# Patient Record
Sex: FEMALE | Race: WHITE | NOT HISPANIC OR LATINO | Employment: UNEMPLOYED | URBAN - NONMETROPOLITAN AREA
[De-identification: names, ages, dates, MRNs, and addresses within clinical notes are randomized per-mention and may not be internally consistent; named-entity substitution may affect disease eponyms.]

---

## 2018-05-31 ENCOUNTER — OFFICE VISIT (OUTPATIENT)
Dept: ORTHOPEDIC SURGERY | Facility: CLINIC | Age: 43
End: 2018-05-31

## 2018-05-31 VITALS — WEIGHT: 185 LBS | HEIGHT: 62 IN | BODY MASS INDEX: 34.04 KG/M2

## 2018-05-31 DIAGNOSIS — M79.641 RIGHT HAND PAIN: Primary | ICD-10-CM

## 2018-05-31 DIAGNOSIS — S62.362A CLOSED NONDISPLACED FRACTURE OF NECK OF THIRD METACARPAL BONE OF RIGHT HAND, INITIAL ENCOUNTER: ICD-10-CM

## 2018-05-31 PROCEDURE — 26600 TREAT METACARPAL FRACTURE: CPT | Performed by: NURSE PRACTITIONER

## 2018-05-31 PROCEDURE — 99214 OFFICE O/P EST MOD 30 MIN: CPT | Performed by: NURSE PRACTITIONER

## 2018-05-31 RX ORDER — METHOCARBAMOL 500 MG/1
500 TABLET, FILM COATED ORAL
COMMUNITY

## 2018-05-31 RX ORDER — HYDROCODONE BITARTRATE AND ACETAMINOPHEN 5; 325 MG/1; MG/1
1 TABLET ORAL EVERY 6 HOURS PRN
Qty: 40 TABLET | Refills: 0 | Status: SHIPPED | OUTPATIENT
Start: 2018-05-31 | End: 2018-06-08

## 2018-05-31 RX ORDER — TRAZODONE HYDROCHLORIDE 50 MG/1
TABLET ORAL
COMMUNITY
Start: 2018-04-17

## 2018-05-31 RX ORDER — GABAPENTIN 300 MG/1
CAPSULE ORAL
COMMUNITY
Start: 2018-04-17

## 2018-05-31 RX ORDER — NAPROXEN 500 MG/1
500 TABLET ORAL 2 TIMES DAILY WITH MEALS
COMMUNITY

## 2018-06-06 DIAGNOSIS — S62.362A CLOSED NONDISPLACED FRACTURE OF NECK OF THIRD METACARPAL BONE OF RIGHT HAND, INITIAL ENCOUNTER: Primary | ICD-10-CM

## 2018-06-08 ENCOUNTER — OFFICE VISIT (OUTPATIENT)
Dept: ORTHOPEDIC SURGERY | Facility: CLINIC | Age: 43
End: 2018-06-08

## 2018-06-08 VITALS — WEIGHT: 191.6 LBS | HEIGHT: 62 IN | BODY MASS INDEX: 35.26 KG/M2

## 2018-06-08 DIAGNOSIS — S62.362D CLOSED NONDISPLACED FRACTURE OF NECK OF THIRD METACARPAL BONE OF RIGHT HAND WITH ROUTINE HEALING, SUBSEQUENT ENCOUNTER: Primary | ICD-10-CM

## 2018-06-08 DIAGNOSIS — M79.641 RIGHT HAND PAIN: ICD-10-CM

## 2018-06-08 PROBLEM — S62.362A CLOSED NONDISPLACED FRACTURE OF NECK OF THIRD METACARPAL BONE OF RIGHT HAND: Status: ACTIVE | Noted: 2018-06-08

## 2018-06-08 PROCEDURE — 99024 POSTOP FOLLOW-UP VISIT: CPT | Performed by: NURSE PRACTITIONER

## 2018-06-08 RX ORDER — HYDROCODONE BITARTRATE AND ACETAMINOPHEN 7.5; 325 MG/1; MG/1
1 TABLET ORAL EVERY 6 HOURS PRN
Qty: 40 TABLET | Refills: 0 | Status: SHIPPED | OUTPATIENT
Start: 2018-06-08 | End: 2018-06-18
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
## 2018-06-21 RX ORDER — HYDROCODONE BITARTRATE AND ACETAMINOPHEN 7.5; 325 MG/1; MG/1
1 TABLET ORAL EVERY 8 HOURS PRN
Qty: 40 TABLET | Refills: 0 | Status: SHIPPED | OUTPATIENT
Start: 2018-06-21

## 2018-06-21 NOTE — TELEPHONE ENCOUNTER
PATIENT IS REQUESTING A NEW PRESCRIPTION FOR NORCO 7.5MG.  SON, CHANDRA BROWN WILL ,    [phone]

## 2018-07-05 DIAGNOSIS — S62.362D CLOSED NONDISPLACED FRACTURE OF NECK OF THIRD METACARPAL BONE OF RIGHT HAND WITH ROUTINE HEALING, SUBSEQUENT ENCOUNTER: Primary | ICD-10-CM

## 2022-07-05 ENCOUNTER — OFFICE VISIT (OUTPATIENT)
Dept: SURGERY | Facility: CLINIC | Age: 47
End: 2022-07-05

## 2022-07-05 VITALS
BODY MASS INDEX: 30.84 KG/M2 | SYSTOLIC BLOOD PRESSURE: 120 MMHG | DIASTOLIC BLOOD PRESSURE: 82 MMHG | HEART RATE: 62 BPM | HEIGHT: 62 IN | WEIGHT: 167.6 LBS | TEMPERATURE: 97.4 F

## 2022-07-05 DIAGNOSIS — R10.31 RIGHT LOWER QUADRANT ABDOMINAL PAIN: Primary | ICD-10-CM

## 2022-07-05 PROCEDURE — 99203 OFFICE O/P NEW LOW 30 MIN: CPT | Performed by: SURGERY

## 2022-07-05 RX ORDER — ERGOCALCIFEROL 1.25 MG/1
50000 CAPSULE ORAL WEEKLY
COMMUNITY
Start: 2022-06-15 | End: 2022-12-13

## 2022-07-05 RX ORDER — TIZANIDINE 4 MG/1
4 TABLET ORAL 2 TIMES DAILY PRN
COMMUNITY
Start: 2022-05-26

## 2022-07-05 RX ORDER — PROMETHAZINE HYDROCHLORIDE 25 MG/1
TABLET ORAL 2 TIMES DAILY
COMMUNITY
Start: 2022-06-06

## 2022-07-05 NOTE — PROGRESS NOTES
Chief Complaint   Patient presents with   • Hernia     Possible Ventral Hernia        HPI  47 year old with lower abdominal pain. Question of an abdominal wall hernia with no hx of incarceration or obstruction.  Past Medical History:   Diagnosis Date   • Back pain    • Depression with anxiety        Past Surgical History:   Procedure Laterality Date   • BREAST SURGERY  2010   • CHOLECYSTECTOMY  1996   • GASTRIC BYPASS  2009   • HERNIA REPAIR  2011   • HYSTERECTOMY  2005         Current Outpatient Medications:   •  ergocalciferol (ERGOCALCIFEROL) 1.25 MG (99158 UT) capsule, Take 50,000 Units by mouth 1 (One) Time Per Week., Disp: , Rfl:   •  naproxen (NAPROSYN) 500 MG tablet, Take 500 mg by mouth 2 (Two) Times a Day With Meals., Disp: , Rfl:   •  promethazine (PHENERGAN) 25 MG tablet, 2 (Two) Times a Day., Disp: , Rfl:   •  tiZANidine (ZANAFLEX) 4 MG tablet, Take 4 mg by mouth 2 (Two) Times a Day As Needed., Disp: , Rfl:   •  gabapentin (NEURONTIN) 300 MG capsule, , Disp: , Rfl:   •  HYDROcodone-acetaminophen (NORCO) 7.5-325 MG per tablet, Take 1 tablet by mouth Every 8 (Eight) Hours As Needed for Moderate Pain ., Disp: 40 tablet, Rfl: 0  •  methocarbamol (ROBAXIN) 500 MG tablet, Take 500 mg by mouth., Disp: , Rfl:   •  traZODone (DESYREL) 50 MG tablet, , Disp: , Rfl:     No Known Allergies    Family History   Problem Relation Age of Onset   • Hypertension Other        Social History     Socioeconomic History   • Marital status:    Tobacco Use   • Smoking status: Never Smoker   • Smokeless tobacco: Never Used   Substance and Sexual Activity   • Alcohol use: No       Review of Systems   Constitutional: Negative.    HENT: Negative.    Eyes: Negative.    Respiratory: Negative.    Cardiovascular: Negative.    Gastrointestinal: Positive for vomiting.   Endocrine: Negative.    Musculoskeletal: Positive for back pain.        Leg pain   Skin: Negative.    Allergic/Immunologic: Negative.    Neurological: Negative.     Hematological: Negative.    Psychiatric/Behavioral: Negative.        Physical Exam  Constitutional:       Appearance: Normal appearance.   Cardiovascular:      Rate and Rhythm: Normal rate and regular rhythm.   Pulmonary:      Effort: Pulmonary effort is normal. No respiratory distress.   Abdominal:      General: Abdomen is flat. There is no distension.      Palpations: Abdomen is soft. There is no mass.      Tenderness: There is no abdominal tenderness.   Musculoskeletal:      Cervical back: Normal range of motion and neck supple.   Neurological:      Mental Status: She is alert.           ASSESSMENT    Diagnoses and all orders for this visit:    1. Right lower quadrant abdominal pain (Primary)  -     CT Abdomen Pelvis With Contrast; Future        PLAN    1. Recheck after above              This document has been electronically signed by Usama Stauffer MD on July 10, 2022 14:02 CDT

## 2022-07-12 ENCOUNTER — PATIENT ROUNDING (BHMG ONLY) (OUTPATIENT)
Dept: SURGERY | Facility: CLINIC | Age: 47
End: 2022-07-12

## 2022-07-12 NOTE — PROGRESS NOTES
"July 12, 2022    Hello, may I speak with David Salazar? This is David    My name is  Ivette Jara    I am a Medical Assistant with [facility]    Before we get started may I verify your date of birth? 1975 Date Of Birth Verified.    I am calling to officially welcome you to our practice and ask about your recent visit. Is this a good time to talk? Yes.    Tell me about your visit with us. What things went well? \"Everything went well.\"     We're always looking for ways to make our patients' experiences even better. Do you have recommendations on ways we may improve? No.    Overall were you satisfied with your first visit to our practice? Yes.     I appreciate you taking the time to speak with me today. Is there anything else I can do for you? No.      Thank you, and have a great day.    Patient is scheduled for follow-up office visit. Patient instructed to call the office with any questions or concerns.  "

## 2022-07-18 ENCOUNTER — TRANSCRIBE ORDERS (OUTPATIENT)
Dept: GENERAL RADIOLOGY | Facility: HOSPITAL | Age: 47
End: 2022-07-18

## 2022-07-18 DIAGNOSIS — R10.31 RIGHT LOWER QUADRANT ABDOMINAL PAIN: Primary | ICD-10-CM

## 2022-07-19 DIAGNOSIS — R10.31 RIGHT LOWER QUADRANT ABDOMINAL PAIN: Primary | ICD-10-CM

## 2022-07-21 ENCOUNTER — APPOINTMENT (OUTPATIENT)
Dept: CT IMAGING | Facility: HOSPITAL | Age: 47
End: 2022-07-21

## 2022-07-22 ENCOUNTER — HOSPITAL ENCOUNTER (OUTPATIENT)
Dept: ULTRASOUND IMAGING | Facility: HOSPITAL | Age: 47
Discharge: HOME | End: 2022-07-22
Admitting: SURGERY

## 2022-07-22 DIAGNOSIS — R10.31 RIGHT LOWER QUADRANT ABDOMINAL PAIN: ICD-10-CM

## 2022-07-22 PROCEDURE — 76705 ECHO EXAM OF ABDOMEN: CPT

## 2022-07-26 ENCOUNTER — OFFICE VISIT (OUTPATIENT)
Dept: SURGERY | Facility: CLINIC | Age: 47
End: 2022-07-26

## 2022-07-26 VITALS
OXYGEN SATURATION: 98 % | HEIGHT: 62 IN | BODY MASS INDEX: 32.02 KG/M2 | DIASTOLIC BLOOD PRESSURE: 76 MMHG | TEMPERATURE: 97.7 F | SYSTOLIC BLOOD PRESSURE: 110 MMHG | WEIGHT: 174 LBS | HEART RATE: 73 BPM

## 2022-07-26 DIAGNOSIS — R10.30 LOWER ABDOMINAL PAIN: Primary | ICD-10-CM

## 2022-07-26 PROCEDURE — 99212 OFFICE O/P EST SF 10 MIN: CPT | Performed by: SURGERY

## 2022-07-26 RX ORDER — TIZANIDINE 4 MG/1
1 TABLET ORAL 2 TIMES DAILY PRN
COMMUNITY
Start: 2022-07-12 | End: 2022-10-13

## 2022-07-29 NOTE — PROGRESS NOTES
Chief Complaint   Patient presents with   • Follow-up     Follow up ultrasound abdomen 07/22/2022        HPI  Minimal abdominal pain.    Study Result    Narrative & Impression      Ultrasound abdomen limited     HISTORY: Palpable mass right periumbilical region.     Ultrasound examination of the area concern periumbilical region  just right of midline performed.     COMPARISON: None     FINDINGS:  Small right periumbilical hernia containing fat and bowel.  This could be further evaluated with CT.  No solid or cystic mass.  No fluid collection.  No lymphadenopathy.     IMPRESSION:  CONCLUSION:  As above.     [id number]     Electronically signed by:  Dewayne Hanks MD  7/22/2022 11:47 AM  CDT Workstation: [id number]       Past Medical History:   Diagnosis Date   • Back pain    • Depression with anxiety        Past Surgical History:   Procedure Laterality Date   • BREAST SURGERY  2010   • CHOLECYSTECTOMY  1996   • GASTRIC BYPASS  2009   • HERNIA REPAIR  2011   • HYSTERECTOMY  2005         Current Outpatient Medications:   •  ergocalciferol (ERGOCALCIFEROL) 1.25 MG (81282 UT) capsule, Take 50,000 Units by mouth 1 (One) Time Per Week., Disp: , Rfl:   •  naproxen (NAPROSYN) 500 MG tablet, Take 500 mg by mouth 2 (Two) Times a Day With Meals., Disp: , Rfl:   •  promethazine (PHENERGAN) 25 MG tablet, 2 (Two) Times a Day., Disp: , Rfl:   •  tiZANidine (ZANAFLEX) 4 MG tablet, Take 4 mg by mouth 2 (Two) Times a Day As Needed., Disp: , Rfl:   •  tiZANidine (ZANAFLEX) 4 MG tablet, Take 1 tablet by mouth 2 (Two) Times a Day As Needed., Disp: , Rfl:   •  gabapentin (NEURONTIN) 300 MG capsule, , Disp: , Rfl:   •  HYDROcodone-acetaminophen (NORCO) 7.5-325 MG per tablet, Take 1 tablet by mouth Every 8 (Eight) Hours As Needed for Moderate Pain ., Disp: 40 tablet, Rfl: 0  •  methocarbamol (ROBAXIN) 500 MG tablet, Take 500 mg by mouth., Disp: , Rfl:   •  traZODone (DESYREL) 50 MG tablet, , Disp: , Rfl:     No Known Allergies    Family History    Problem Relation Age of Onset   • Hypertension Other        Social History     Socioeconomic History   • Marital status:    Tobacco Use   • Smoking status: Never Smoker   • Smokeless tobacco: Never Used   Substance and Sexual Activity   • Alcohol use: No           Physical Exam  Abdominal:      General: Abdomen is flat. There is no distension.      Palpations: Abdomen is soft. There is no mass.      Tenderness: There is no abdominal tenderness. There is no guarding or rebound.      Hernia: No hernia is present.           ASSESSMENT    Diagnoses and all orders for this visit:    1. Lower abdominal pain (Primary)        PLAN    1.  No evidence of hernia incarceration or need for operation at this juncture.  2.  We will follow as needed              This document has been electronically signed by Usama Stauffer MD on July 28, 2022 20:06 CDT

## 2023-08-03 ENCOUNTER — TRANSCRIBE ORDERS (OUTPATIENT)
Dept: PODIATRY | Facility: CLINIC | Age: 48
End: 2023-08-03
Payer: COMMERCIAL

## 2023-08-03 DIAGNOSIS — M20.41 HAMMERTOE OF RIGHT FOOT: ICD-10-CM

## 2023-08-03 DIAGNOSIS — M21.611 BUNION OF RIGHT FOOT: Primary | ICD-10-CM

## 2023-08-15 ENCOUNTER — OFFICE VISIT (OUTPATIENT)
Dept: PODIATRY | Facility: CLINIC | Age: 48
End: 2023-08-15
Payer: COMMERCIAL

## 2023-08-15 VITALS
BODY MASS INDEX: 37.91 KG/M2 | DIASTOLIC BLOOD PRESSURE: 96 MMHG | HEIGHT: 62 IN | OXYGEN SATURATION: 93 % | WEIGHT: 206 LBS | HEART RATE: 116 BPM | SYSTOLIC BLOOD PRESSURE: 158 MMHG

## 2023-08-15 DIAGNOSIS — M79.671 RIGHT FOOT PAIN: Primary | ICD-10-CM

## 2023-08-15 NOTE — PROGRESS NOTES
"David Salazar  1975  48 y.o. female  PCP: Tonya Pendleton,  07/31/2023  Non-Diabetic      08/15/2023      Chief Complaint   Patient presents with    Right Foot - Pain       History of Present Illness    David Salazar is a 48 y.o.female. Patient presents to clinic with chief complaint of Right foot third toe pain. X-rays obtained today.      Past Medical History:   Diagnosis Date    Back pain     Depression with anxiety          Past Surgical History:   Procedure Laterality Date    BREAST SURGERY  2010    CHOLECYSTECTOMY  1996    GASTRIC BYPASS  2009    HERNIA REPAIR  2011    HYSTERECTOMY  2005         Family History   Problem Relation Age of Onset    Hypertension Other        No Known Allergies    Social History     Socioeconomic History    Marital status:    Tobacco Use    Smoking status: Never    Smokeless tobacco: Never   Substance and Sexual Activity    Alcohol use: No         Current Outpatient Medications   Medication Sig Dispense Refill    gabapentin (NEURONTIN) 300 MG capsule       HYDROcodone-acetaminophen (NORCO) 7.5-325 MG per tablet Take 1 tablet by mouth Every 8 (Eight) Hours As Needed for Moderate Pain . 40 tablet 0    methocarbamol (ROBAXIN) 500 MG tablet Take 1 tablet by mouth.      naproxen (NAPROSYN) 500 MG tablet Take 1 tablet by mouth 2 (Two) Times a Day With Meals.      promethazine (PHENERGAN) 25 MG tablet 2 (Two) Times a Day.      tiZANidine (ZANAFLEX) 4 MG tablet Take 1 tablet by mouth 2 (Two) Times a Day As Needed.      traZODone (DESYREL) 50 MG tablet        No current facility-administered medications for this visit.       Review of Systems   Constitutional: Negative.    Cardiovascular: Negative.    Musculoskeletal:         Right third toe pain       OBJECTIVE    /96   Pulse 116   Ht 157.5 cm (62.01\")   Wt 93.4 kg (206 lb)   SpO2 93%   BMI 37.67 kg/mý     Physical Exam  Cardiovascular:      Pulses:           Dorsalis pedis pulses are 2+ on the right side.        " Posterior tibial pulses are 2+ on the right side.   Pulmonary:      Effort: Pulmonary effort is normal.   Musculoskeletal:        Feet:    Neurological:      Mental Status: She is alert.   Psychiatric:         Mood and Affect: Mood normal.                Procedures        ASSESSMENT AND PLAN    Diagnoses and all orders for this visit:    1. Right foot pain (Primary)  -     XR Foot 3+ View Right      -Patient examined and evaluated  -Imaging reviewed.  No obvious acute pathology.  -No treatment necessary at this time.  Progress activity as tolerated without restriction.  Transition to regular shoe gear.  -Answered all questions  -Recheck as needed            This document has been electronically signed by Favio Hagen DPM on August 20, 2023 10:22 CDT     8/20/2023  10:22 CDT